# Patient Record
Sex: MALE | Race: BLACK OR AFRICAN AMERICAN | Employment: FULL TIME | URBAN - METROPOLITAN AREA
[De-identification: names, ages, dates, MRNs, and addresses within clinical notes are randomized per-mention and may not be internally consistent; named-entity substitution may affect disease eponyms.]

---

## 2017-09-20 ENCOUNTER — HOSPITAL ENCOUNTER (OUTPATIENT)
Dept: GENERAL RADIOLOGY | Age: 52
Discharge: HOME | End: 2017-09-20
Payer: COMMERCIAL

## 2017-09-20 DIAGNOSIS — R06.02 SOB (SHORTNESS OF BREATH): ICD-10-CM

## 2017-09-20 PROCEDURE — 71020 XR CHEST PA LAT: CPT

## 2018-12-20 ENCOUNTER — OFFICE VISIT (OUTPATIENT)
Dept: ORTHOPEDIC SURGERY | Facility: CLINIC | Age: 53
End: 2018-12-20

## 2018-12-20 VITALS
HEART RATE: 72 BPM | DIASTOLIC BLOOD PRESSURE: 88 MMHG | HEIGHT: 64 IN | TEMPERATURE: 98 F | OXYGEN SATURATION: 100 % | BODY MASS INDEX: 31.62 KG/M2 | SYSTOLIC BLOOD PRESSURE: 138 MMHG | RESPIRATION RATE: 16 BRPM | WEIGHT: 185.2 LBS

## 2018-12-20 DIAGNOSIS — M25.562 LEFT KNEE PAIN, UNSPECIFIED CHRONICITY: ICD-10-CM

## 2018-12-20 DIAGNOSIS — M22.42 CHONDROMALACIA PATELLAE, LEFT: ICD-10-CM

## 2018-12-20 DIAGNOSIS — M25.561 RIGHT KNEE PAIN, UNSPECIFIED CHRONICITY: ICD-10-CM

## 2018-12-20 DIAGNOSIS — M17.12 PRIMARY OSTEOARTHRITIS OF LEFT KNEE: Primary | ICD-10-CM

## 2018-12-20 RX ORDER — BETAMETHASONE SODIUM PHOSPHATE AND BETAMETHASONE ACETATE 3; 3 MG/ML; MG/ML
6 INJECTION, SUSPENSION INTRA-ARTICULAR; INTRALESIONAL; INTRAMUSCULAR; SOFT TISSUE ONCE
Qty: 0.5 ML | Refills: 0
Start: 2018-12-20 | End: 2018-12-20

## 2018-12-20 RX ORDER — BUPIVACAINE HYDROCHLORIDE 2.5 MG/ML
4 INJECTION, SOLUTION EPIDURAL; INFILTRATION; INTRACAUDAL ONCE
Qty: 4 ML | Refills: 0
Start: 2018-12-20 | End: 2018-12-20

## 2018-12-20 NOTE — PROGRESS NOTES
Patient: Ambar Jackson                MRN: [id number]       SSN: [id number]  YOB: 1965        AGE: 52 y.o. SEX: male    PCP: Walt Vargas., MD  12/20/18    Chief Complaint   Patient presents with    Knee Pain     evelyne knee pain      HISTORY:  Ambar Jackson is a 52 y.o. male who is seen for bilateral L>R knee pain. He has been experiencing increasing knee pain for the past year. He reports no h/o injury. 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 He notes pain with standing and walking. He has pain ascending/descending stairs. He reports difficulty with everyday activities. He feels a constant popping in his left knee. Pain Assessment  12/20/2018   Location of Pain Knee   Location Modifiers Right;Left   Severity of Pain 4   Quality of Pain Dull;Locking; Popping   Quality of Pain Comment left knee popping locking    Duration of Pain A few minutes   Frequency of Pain Intermittent   Aggravating Factors Straightening   Aggravating Factors Comment after sitting for a while    Limiting Behavior Some   Relieving Factors Rest     Occupation, etc:  Mr. Alirio Agrawal is self employed at [employer]. 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 He has a 12yo daughter who lives in [place]. He is a [employer] fan. Current weight is 185 pounds. He is 5'4\" tall. No results found for: HBA1C, HGBE8, JMQ7IWZS, GKY5XTKS, PIJ1BFZI  Weight Metrics 12/20/2018 12/8/2017 10/31/2017   Weight 185 lb 3.2 oz 180 lb 180 lb   BMI 31.79 kg/m2 30.9 kg/m2 30.9 kg/m2       There is no problem list on file for this patient. REVIEW OF SYSTEMS: All Below are Negative except: See HPI   Constitutional: negative for fever, chills, and weight loss.    Cardiovascular: negative for chest pain, claudication, leg swelling, SOB, DOMINGUEZ   Gastrointestinal: Negative for pain, N/V/C/D, Blood in stool or urine, dysuria,  hematuria, incontinence, pelvic pain. Musculoskeletal: See HPI   Neurological: Negative for dizziness and weakness. Negative for headaches, Visual changes, confusion, seizures   Phychiatric/Behavioral: Negative for depression, memory loss, substance  abuse. Extremities: Negative for hair changes, rash, or skin lesion changes. Hematologic: Negative for bleeding problems, bruising, pallor or swollen lymph  nodes   Peripheral Vascular: No calf pain, no circulation deficits. Social History     Socioeconomic History    Marital status: UNKNOWN     Spouse name: Not on file    Number of children: Not on file    Years of education: Not on file    Highest education level: Not on file   Social Needs    Financial resource strain: Not on file    Food insecurity - worry: Not on file    Food insecurity - inability: Not on file   [employer] needs - medical: Not on file   [employer] needs - non-medical: Not on file   Occupational History    Not on file   Tobacco Use    Smoking status: Current Every Day Smoker    Smokeless tobacco: Current User   Substance and Sexual Activity    Alcohol use: Not on file    Drug use: Not on file    Sexual activity: Not on file   Other Topics Concern    Not on file   Social History Narrative    Not on file      No Known Allergies   Current Outpatient Medications   Medication Sig    testosterone (ANDROGEL) 12.5 mg/ 1.25 gram (1 %) glpm by TransDERmal route.  oxyCODONE-acetaminophen (PERCOCET) 5-325 mg per tablet Take 1 Tab by mouth every four (4) hours as needed for Pain. Max Daily Amount: 6 Tabs. No current facility-administered medications for this visit.        PHYSICAL EXAMINATION:  Visit Vitals  /88   Pulse 72   Temp 98 °F (36.7 °C) (Oral)   Resp 16   Ht 5' 4\" (1.626 m)   Wt 185 lb 3.2 oz (84 kg)   SpO2 100%   BMI 31.79 kg/m²      ORTHO EXAMINATION:  Examination Right knee Left knee   Skin Intact Intact   Range of motion 120-0 120-0   Effusion - -   Medial joint line tenderness + ++   Lateral joint line tenderness - -   Popliteal tenderness - -   Osteophytes palpable + +   Ducs - -   Patella crepitus + ++   Anterior drawer - -   Lateral laxity - -   Medial laxity - -   Varus deformity - -   Valgus deformity - -   Pretibial edema +2 +2   Calf tenderness - -     PROCEDURE:  After discussing treatment options, patient's left knee was injected with 4 cc Marcaine and 1/2 cc Celestone. Chart reviewed for the following:   Esa Diaz MD, have reviewed the History, Physical and updated the Allergic reactions for [place] [address] performed immediately prior to start of procedure:  Esa Diaz MD, have performed the following reviews on Rhonda Chambers prior to the start of the procedure:            * Patient was identified by name and date of birth   * Agreement on procedure being performed was verified  * Risks and Benefits explained to the patient  * Procedure site verified and marked as necessary  * Patient was positioned for comfort  * Consent was obtained     Time: 4:08 PM     Date of procedure: 12/20/2018    Procedure performed by:  Guido Zavaleta MD    Mr. Yolis Gallardo tolerated the procedure well with no complications. RADIOGRAPHS:  XR HARISH KNEE 12/20/18 FRANCISCA  IMPRESSION:  Three views - No fractures, no effusion, mild joint space narrowing, + osteophytes present. IKDC Grade B    IMPRESSION:      ICD-10-CM ICD-9-CM    1. Primary osteoarthritis of left knee M17.12 715.16 betamethasone (CELESTONE SOLUSPAN) 6 mg/mL injection      BETAMETHASONE ACETATE & SODIUM PHOSPHATE INJECTION 3 MG EA.      DRAIN/INJECT LARGE JOINT/BURSA      bupivacaine, PF, (MARCAINE, PF,) 0.25 % (2.5 mg/mL) injection   2.  Left knee pain, unspecified chronicity M25.562 719.46 AMB POC X-RAY KNEE 3 VIEW      betamethasone (CELESTONE SOLUSPAN) 6 mg/mL injection      BETAMETHASONE ACETATE & SODIUM PHOSPHATE INJECTION 3 MG EA.      DRAIN/INJECT LARGE JOINT/BURSA bupivacaine, PF, (MARCAINE, PF,) 0.25 % (2.5 mg/mL) injection   3. Right knee pain, unspecified chronicity M25.561 719.46 AMB POC X-RAY KNEE 3 VIEW   4. Chondromalacia patellae, left M22.42 717.7 betamethasone (CELESTONE SOLUSPAN) 6 mg/mL injection      BETAMETHASONE ACETATE & SODIUM PHOSPHATE INJECTION 3 MG EA.      DRAIN/INJECT LARGE JOINT/BURSA      bupivacaine, PF, (MARCAINE, PF,) 0.25 % (2.5 mg/mL) injection     PLAN:  After discussing treatment options, patient's left knee was injected with 4 cc Marcaine and 1/2 cc Celestone. He will follow up as needed. There is no need for surgery at this time.       Scribed by Priti Weinstein ([facility]) as dictated by Stiven Almendarez MD